# Patient Record
Sex: MALE | Employment: FULL TIME | URBAN - METROPOLITAN AREA
[De-identification: names, ages, dates, MRNs, and addresses within clinical notes are randomized per-mention and may not be internally consistent; named-entity substitution may affect disease eponyms.]

---

## 2019-09-13 ENCOUNTER — HOSPITAL ENCOUNTER (EMERGENCY)
Age: 29
Discharge: HOME | End: 2019-09-13
Attending: EMERGENCY MEDICINE
Payer: COMMERCIAL

## 2019-09-13 ENCOUNTER — APPOINTMENT (OUTPATIENT)
Dept: GENERAL RADIOLOGY | Age: 29
End: 2019-09-13
Payer: COMMERCIAL

## 2019-09-13 VITALS
SYSTOLIC BLOOD PRESSURE: 148 MMHG | RESPIRATION RATE: 18 BRPM | WEIGHT: 250 LBS | HEART RATE: 79 BPM | TEMPERATURE: 97.2 F | BODY MASS INDEX: 32.08 KG/M2 | OXYGEN SATURATION: 98 % | DIASTOLIC BLOOD PRESSURE: 91 MMHG | HEIGHT: 74 IN

## 2019-09-13 DIAGNOSIS — J90 PLEURAL EFFUSION: ICD-10-CM

## 2019-09-13 DIAGNOSIS — R10.9 FLANK PAIN: Primary | ICD-10-CM

## 2019-09-13 LAB
-: ABNORMAL
AMORPHOUS: ABNORMAL
BACTERIA: ABNORMAL
BILIRUBIN URINE: NEGATIVE
CASTS UA: ABNORMAL /LPF (ref 0–8)
COLOR: YELLOW
CRYSTALS, UA: ABNORMAL /HPF
EPITHELIAL CELLS UA: ABNORMAL /HPF (ref 0–5)
GLUCOSE URINE: NEGATIVE
KETONES, URINE: NEGATIVE
LEUKOCYTE ESTERASE, URINE: ABNORMAL
MUCUS: ABNORMAL
NITRITE, URINE: NEGATIVE
OTHER OBSERVATIONS UA: ABNORMAL
PH UA: 5 (ref 5–8)
PROTEIN UA: NEGATIVE
RBC UA: ABNORMAL /HPF (ref 0–4)
RENAL EPITHELIAL, UA: ABNORMAL /HPF
SPECIFIC GRAVITY UA: 1.02 (ref 1–1.03)
TRICHOMONAS: ABNORMAL
TROPONIN INTERP: NORMAL
TROPONIN T: NORMAL NG/ML
TROPONIN, HIGH SENSITIVITY: 11 NG/L (ref 0–22)
TURBIDITY: CLEAR
URINE HGB: NEGATIVE
UROBILINOGEN, URINE: NORMAL
WBC UA: ABNORMAL /HPF (ref 0–5)
YEAST: ABNORMAL

## 2019-09-13 PROCEDURE — 71046 X-RAY EXAM CHEST 2 VIEWS: CPT

## 2019-09-13 PROCEDURE — 81001 URINALYSIS AUTO W/SCOPE: CPT

## 2019-09-13 PROCEDURE — 84484 ASSAY OF TROPONIN QUANT: CPT

## 2019-09-13 PROCEDURE — 99284 EMERGENCY DEPT VISIT MOD MDM: CPT

## 2019-09-13 PROCEDURE — 93005 ELECTROCARDIOGRAM TRACING: CPT | Performed by: STUDENT IN AN ORGANIZED HEALTH CARE EDUCATION/TRAINING PROGRAM

## 2019-09-13 PROCEDURE — 6370000000 HC RX 637 (ALT 250 FOR IP): Performed by: STUDENT IN AN ORGANIZED HEALTH CARE EDUCATION/TRAINING PROGRAM

## 2019-09-13 RX ORDER — AZITHROMYCIN 250 MG/1
TABLET, FILM COATED ORAL
Qty: 1 PACKET | Refills: 0 | Status: SHIPPED | OUTPATIENT
Start: 2019-09-13 | End: 2019-09-17

## 2019-09-13 RX ORDER — AZITHROMYCIN 250 MG/1
500 TABLET, FILM COATED ORAL ONCE
Status: COMPLETED | OUTPATIENT
Start: 2019-09-13 | End: 2019-09-13

## 2019-09-13 RX ORDER — CYCLOBENZAPRINE HCL 10 MG
5 TABLET ORAL ONCE
Status: DISCONTINUED | OUTPATIENT
Start: 2019-09-13 | End: 2019-09-13 | Stop reason: HOSPADM

## 2019-09-13 RX ORDER — ACETAMINOPHEN 325 MG/1
325 TABLET ORAL EVERY 6 HOURS PRN
Qty: 120 TABLET | Refills: 3 | Status: SHIPPED | OUTPATIENT
Start: 2019-09-13

## 2019-09-13 RX ORDER — CYCLOBENZAPRINE HCL 5 MG
5 TABLET ORAL 2 TIMES DAILY PRN
Qty: 20 TABLET | Refills: 0 | Status: SHIPPED | OUTPATIENT
Start: 2019-09-13 | End: 2019-09-23

## 2019-09-13 RX ORDER — ACETAMINOPHEN 325 MG/1
650 TABLET ORAL ONCE
Status: COMPLETED | OUTPATIENT
Start: 2019-09-13 | End: 2019-09-13

## 2019-09-13 RX ADMIN — ACETAMINOPHEN 650 MG: 325 TABLET ORAL at 01:38

## 2019-09-13 RX ADMIN — AZITHROMYCIN 500 MG: 250 TABLET, FILM COATED ORAL at 02:47

## 2019-09-13 ASSESSMENT — PAIN SCALES - GENERAL
PAINLEVEL_OUTOF10: 8
PAINLEVEL_OUTOF10: 8

## 2019-09-13 ASSESSMENT — PAIN DESCRIPTION - PAIN TYPE: TYPE: ACUTE PAIN

## 2019-09-13 ASSESSMENT — PAIN DESCRIPTION - LOCATION: LOCATION: FLANK

## 2019-09-13 ASSESSMENT — PAIN DESCRIPTION - ORIENTATION: ORIENTATION: RIGHT

## 2019-09-13 ASSESSMENT — PAIN DESCRIPTION - DESCRIPTORS: DESCRIPTORS: SHARP;STABBING

## 2019-09-13 NOTE — ED NOTES
Pt to ED c/o right sided flank pain with radiation to the right shoulder. Pt states that he has been having this pain for the past couple of weeks, but reports worsened pain today. Pt denies any known injury or trauma. Pt denies cardiac hx. Pt reports some SOB when he was walking up the stairs at his apartment.   On exam, pt awake and oriented x 4, pt ambulatory with steady gait, pt with stable vital signs     Luis Miguel Galdamez RN  09/13/19 [id number]

## 2019-09-15 LAB
EKG ATRIAL RATE: 62 BPM
EKG P AXIS: 27 DEGREES
EKG P-R INTERVAL: 218 MS
EKG Q-T INTERVAL: 404 MS
EKG QRS DURATION: 108 MS
EKG QTC CALCULATION (BAZETT): 410 MS
EKG R AXIS: 25 DEGREES
EKG T AXIS: -2 DEGREES
EKG VENTRICULAR RATE: 62 BPM

## 2019-09-15 PROCEDURE — 93010 ELECTROCARDIOGRAM REPORT: CPT | Performed by: INTERNAL MEDICINE

## 2020-11-09 ENCOUNTER — HOSPITAL ENCOUNTER (OUTPATIENT)
Age: 30
Setting detail: SPECIMEN
Discharge: HOME | End: 2020-11-09
Payer: COMMERCIAL

## 2020-11-09 ENCOUNTER — OFFICE VISIT (OUTPATIENT)
Dept: PRIMARY CARE CLINIC | Age: 30
End: 2020-11-09
Payer: COMMERCIAL

## 2020-11-09 VITALS
HEIGHT: 74 IN | WEIGHT: 250 LBS | BODY MASS INDEX: 32.08 KG/M2 | RESPIRATION RATE: 16 BRPM | TEMPERATURE: 98.6 F | SYSTOLIC BLOOD PRESSURE: 121 MMHG | DIASTOLIC BLOOD PRESSURE: 74 MMHG | OXYGEN SATURATION: 99 % | HEART RATE: 48 BPM

## 2020-11-09 PROCEDURE — 99213 OFFICE O/P EST LOW 20 MIN: CPT | Performed by: PHYSICIAN ASSISTANT

## 2020-11-09 ASSESSMENT — ENCOUNTER SYMPTOMS
SORE THROAT: 0
SINUS PAIN: 0
VISUAL CHANGE: 0
COUGH: 1
NAUSEA: 0
SWOLLEN GLANDS: 0
VOMITING: 0
CHANGE IN BOWEL HABIT: 0
EYES NEGATIVE: 1
RHINORRHEA: 1
ABDOMINAL PAIN: 0
SINUS PRESSURE: 1
CHEST TIGHTNESS: 0

## 2020-11-09 ASSESSMENT — PATIENT HEALTH QUESTIONNAIRE - PHQ9
1. LITTLE INTEREST OR PLEASURE IN DOING THINGS: 0
SUM OF ALL RESPONSES TO PHQ QUESTIONS 1-9: 0
SUM OF ALL RESPONSES TO PHQ QUESTIONS 1-9: 0
SUM OF ALL RESPONSES TO PHQ9 QUESTIONS 1 & 2: 0
SUM OF ALL RESPONSES TO PHQ QUESTIONS 1-9: 0
2. FEELING DOWN, DEPRESSED OR HOPELESS: 0

## 2020-11-09 NOTE — PROGRESS NOTES
gloves and washed with hot water or in a . Clean hands after handling used  items.  If possible, dedicate a lined trash can for the ill person. Use gloves when removing garbage bags, handling, and disposing of trash. Wash hands after handling or disposing of trash.  Consider consulting with your local health department about trash disposal guidance if available. Information for Household Members and Caregivers of Someone who is Sick   Call ahead before visiting your doctor   Call ahead: If you have a medical appointment, call the healthcare provider and tell them that you have or may have COVID-19. This will help the healthcare provider's office take steps to keep other people from getting infected or exposed. Wear a facemask if you are sick   ; If you are sick: You should wear a facemask when you are around other people (e.g., sharing a room or vehicle) or pets and before you enter a healthcare provider's office. ; If you are caring for others: If the person who is sick is not able to wear a facemask (for example, because it causes trouble breathing), then people who live with the person who is sick should not stay in the same room with them, or they should wear a facemask if they enter a room with the person who is sick. Cover your coughs and sneezes   ; Cover: Cover your mouth and nose with a tissue when you cough or sneeze.   ; Dispose: Throw used tissues in a lined trash can.   ; Wash hands: Immediately wash your hands with soap and water for at least 20 seconds or, if soap and water are not available, clean your hands with an alcohol-based hand  that contains at least 60% alcohol. Clean your hands often   ;  Wash hands: Wash your hands often with soap and water for at least 20 seconds, especially after blowing your nose, coughing, or sneezing; going to the bathroom; and before eating or preparing food.   ; Hand : If soap and water are not readily available, use an alcohol-based hand  with at least 60% alcohol, covering all surfaces of your hands and rubbing them together until they feel dry.   ; Soap and water: Soap and water are the best option if hands are visibly dirty.   ; Avoid touching: Avoid touching your eyes, nose, and mouth with unwashed hands. Handwashing Tips   ; Wet your hands with clean, running water (warm or cold), turn off the tap, and apply soap.  ; Lather your hands by rubbing them together with the soap. Lather the backs of your hands, between your fingers, and under your nails. ; Scrub your hands for at least 20 seconds. Need a timer? Hum the [place] from beginning to end twice.  ; Rinse your hands well under clean, running water.  ; Dry your hands using a clean towel or air dry them. Avoid sharing personal household items   ; Do not share: You should not share dishes, drinking glasses, cups, eating utensils, towels, or bedding with other people or pets in your home.   ; Wash thoroughly after use: After using these items, they should be washed thoroughly with soap and water. Clean all high-touch surfaces everyday   ; Clean and disinfect: Practice routine cleaning of high touch surfaces.  ; High touch surfaces include counters, tabletops, doorknobs, bathroom fixtures, toilets, phones, keyboards, tablets, and bedside tables.  ; Disinfect areas with bodily fluids: Also, clean any surfaces that may have blood, stool, or body fluids on them.   ; Household : Use a household cleaning spray or wipe, according to the label instructions. Labels contain instructions for safe and effective use of the cleaning product including precautions you should take when applying the product, such as wearing gloves and making sure you have good ventilation during use of the product.     Monitor your symptoms   Seek medical attention: Seek prompt medical attention if your illness is worsening     (e.g., difficulty breathing).   ; Call your doctor: Before seeking care, call your healthcare provider and tell them that you have, or are being evaluated for, COVID-19.   ; Wear a facemask when sick: Put on a facemask before you enter the facility. These steps will help the healthcare provider's office to keep other people in the office or waiting room from getting infected or exposed. ; Alert health department: Ask your healthcare provider to call the local or state health department. Persons who are placed under active monitoring or facilitated self-monitoring should follow instructions provided by their local health department or occupational health professionals, as appropriate.  ; Call 911 if you have a medical emergency: If you have a medical emergency and need to call 911, notify the dispatch personnel that you have, or are being evaluated for COVID-19. If possible, put on a facemask before emergency medical services arrive. Patient instructed to return to the office if symptoms worsen, return, or have any other concerns. Patient understands and is agreeable.          Rohit Isbell PA-C 11/9/2020 11:25 AM

## 2020-11-09 NOTE — LETTER
[address] [place] [address]  [facility] [address]  Phone: [phone]  Fax: [phone]    Ellen Curry PA-C        November 9, 2020     Patient: Kellie Bowman   YOB: 1990   Date of Visit: 11/9/2020       To Whom it May Concern:    Kellie Bowman was seen in my clinic on 11/9/2020. He is to remain off work until his [place] Number comes back negative      If you have any questions or concerns, please don't hesitate to call.     Sincerely,         Ellen Curry PA-C

## 2020-11-15 LAB — SARS-COV-2, NAA: DETECTED

## 2020-11-16 ENCOUNTER — TELEPHONE (OUTPATIENT)
Dept: ADMINISTRATIVE | Age: 30
End: 2020-11-16

## 2020-11-16 ENCOUNTER — TELEPHONE (OUTPATIENT)
Dept: PRIMARY CARE CLINIC | Age: 30
End: 2020-11-16

## 2022-10-25 ENCOUNTER — HOSPITAL ENCOUNTER (OUTPATIENT)
Dept: GENERAL RADIOLOGY | Facility: CLINIC | Age: 32
Discharge: HOME | End: 2022-10-27
Payer: COMMERCIAL

## 2022-10-25 DIAGNOSIS — M79.642 PAIN OF LEFT HAND: ICD-10-CM

## 2022-10-25 DIAGNOSIS — R20.2 LEFT HAND PARESTHESIA: ICD-10-CM

## 2022-10-25 PROCEDURE — 73130 X-RAY EXAM OF HAND: CPT

## 2022-10-25 PROCEDURE — 73080 X-RAY EXAM OF ELBOW: CPT

## 2023-04-12 LAB
GENETICS TEST NAME-DATA CONVERSION: NORMAL
LAB MOLECULAR CA TECHNICAL NOTES: NORMAL

## 2023-08-31 PROBLEM — R76.0 ANTIPHOSPHOLIPID ANTIBODY POSITIVE: Status: ACTIVE | Noted: 2023-08-31

## 2023-08-31 PROBLEM — E78.41 ELEVATED LIPOPROTEIN A LEVEL: Status: ACTIVE | Noted: 2023-08-31

## 2023-08-31 PROBLEM — I21.09: Status: ACTIVE | Noted: 2023-08-31

## 2023-08-31 PROBLEM — I51.3 LEFT VENTRICULAR THROMBUS: Status: ACTIVE | Noted: 2023-08-31

## 2023-08-31 PROBLEM — I25.42 SPONTANEOUS DISSECTION OF CORONARY ARTERY: Status: ACTIVE | Noted: 2023-08-31

## 2023-08-31 RX ORDER — ASPIRIN 81 MG/1
81 TABLET ORAL DAILY
COMMUNITY

## 2023-08-31 RX ORDER — ATORVASTATIN CALCIUM 40 MG/1
40 TABLET, FILM COATED ORAL DAILY
COMMUNITY

## 2023-08-31 RX ORDER — LISINOPRIL 2.5 MG/1
1 TABLET ORAL DAILY
COMMUNITY

## 2023-08-31 RX ORDER — METOPROLOL SUCCINATE 25 MG/1
1 TABLET, EXTENDED RELEASE ORAL DAILY
COMMUNITY

## 2023-08-31 RX ORDER — WARFARIN 7.5 MG/1
1 TABLET ORAL
COMMUNITY

## 2023-08-31 RX ORDER — NITROGLYCERIN 0.4 MG/1
0.4 TABLET SUBLINGUAL EVERY 5 MIN PRN
COMMUNITY

## 2023-10-09 ENCOUNTER — APPOINTMENT (OUTPATIENT)
Dept: CARDIOLOGY | Facility: HOSPITAL | Age: 33
End: 2023-10-09
Payer: COMMERCIAL

## 2023-10-31 ENCOUNTER — APPOINTMENT (OUTPATIENT)
Dept: CARDIOLOGY | Facility: HOSPITAL | Age: 33
End: 2023-10-31
Payer: COMMERCIAL

## 2024-04-08 ENCOUNTER — OFFICE VISIT (OUTPATIENT)
Dept: ORTHOPEDIC SURGERY | Age: 34
End: 2024-04-08
Payer: COMMERCIAL

## 2024-04-08 VITALS — WEIGHT: 260 LBS | RESPIRATION RATE: 14 BRPM | BODY MASS INDEX: 32.33 KG/M2 | HEIGHT: 75 IN

## 2024-04-08 DIAGNOSIS — M25.512 ACUTE PAIN OF LEFT SHOULDER: ICD-10-CM

## 2024-04-08 DIAGNOSIS — M77.12 LATERAL EPICONDYLITIS OF LEFT ELBOW: Primary | ICD-10-CM

## 2024-04-08 DIAGNOSIS — M25.522 LEFT ELBOW PAIN: ICD-10-CM

## 2024-04-08 DIAGNOSIS — M75.82 ROTATOR CUFF TENDONITIS, LEFT: ICD-10-CM

## 2024-04-08 PROCEDURE — 99203 OFFICE O/P NEW LOW 30 MIN: CPT

## 2024-04-08 RX ORDER — DICLOFENAC SODIUM 75 MG/1
75 TABLET, DELAYED RELEASE ORAL 2 TIMES DAILY
Qty: 28 TABLET | Refills: 0 | Status: SHIPPED | OUTPATIENT
Start: 2024-04-08 | End: 2024-04-22

## 2024-04-08 NOTE — PROGRESS NOTES
Orthopedic Shoulder Encounter Note     Chief complaint: Left shoulder pain; Left elbow pain    HPI: Andres Burkett is a 33 y.o.  right-hand dominant male who presents for evaluation of his left shoulder and elbow.  Patient states that about 3 months ago he started to notice some pain over the lateral aspect of his left elbow.  Patient participates in [employer] about 5 to 6 days a week and is very active.  He states that the pain at the elbow started off being mild but it has increased and now is causing him discomfort all the time.  He states that there was no specific injury or trauma to the area but states that he started to notice that more prominently with activities involving picking up and movement at the wrist.  Patient pinpoints the pain over the lateral aspect of the elbow near the joint line.  In addition patient states that about a week ago he noticed the left shoulder started to bother him again with no inciting event or trauma.  States that it is hard to localize the pain but would say it is more over the lateral aspect of the shoulder.  Denies any weakness or stiffness in that shoulder.  Denies any numbness or tingling into the hand at this point.  Patient has not tried much in terms of management of these conditions other than some stretching on his own prior to working out.  States that he did use a massage gun over the elbow which did provide him a little relief but that was before it was bothering him on a daily basis.    Previous treatment:    NSAIDs: None    Physical Therapy: None    Injections: None    Surgeries: None    Review of Systems:   Constitutional: Negative for fever, chills, sweats.   Pain Score:   5  Neurological: Negative for headache, numbness, or weakness.   Musculoskeletal: As noted in HPI     Past Medical History  Andres  has a past medical history of Cancer (HCC).    Past Surgical History  Andres  has a past surgical history that includes orthopedic surgery.    Current

## 2024-06-17 ENCOUNTER — OFFICE VISIT (OUTPATIENT)
Dept: ORTHOPEDIC SURGERY | Age: 34
End: 2024-06-17
Payer: COMMERCIAL

## 2024-06-17 VITALS — RESPIRATION RATE: 16 BRPM | HEIGHT: 75 IN | WEIGHT: 260 LBS | BODY MASS INDEX: 32.33 KG/M2

## 2024-06-17 DIAGNOSIS — M77.11 LATERAL EPICONDYLITIS OF BOTH ELBOWS: Primary | ICD-10-CM

## 2024-06-17 DIAGNOSIS — M77.12 LATERAL EPICONDYLITIS OF BOTH ELBOWS: Primary | ICD-10-CM

## 2024-06-17 DIAGNOSIS — M25.521 RIGHT ELBOW PAIN: ICD-10-CM

## 2024-06-17 PROCEDURE — 20551 NJX 1 TENDON ORIGIN/INSJ: CPT

## 2024-06-17 PROCEDURE — 99213 OFFICE O/P EST LOW 20 MIN: CPT

## 2024-06-17 RX ORDER — LIDOCAINE HYDROCHLORIDE 10 MG/ML
1 INJECTION, SOLUTION INFILTRATION; PERINEURAL ONCE
Status: COMPLETED | OUTPATIENT
Start: 2024-06-17 | End: 2024-06-17

## 2024-06-17 RX ORDER — LIDOCAINE HYDROCHLORIDE 10 MG/ML
2 INJECTION, SOLUTION INFILTRATION; PERINEURAL ONCE
Status: SHIPPED | OUTPATIENT
Start: 2024-06-17

## 2024-06-17 RX ORDER — TRIAMCINOLONE ACETONIDE 40 MG/ML
40 INJECTION, SUSPENSION INTRA-ARTICULAR; INTRAMUSCULAR ONCE
Status: COMPLETED | OUTPATIENT
Start: 2024-06-17 | End: 2024-06-17

## 2024-06-17 RX ADMIN — TRIAMCINOLONE ACETONIDE 40 MG: 40 INJECTION, SUSPENSION INTRA-ARTICULAR; INTRAMUSCULAR at 09:39

## 2024-06-17 RX ADMIN — LIDOCAINE HYDROCHLORIDE 1 ML: 10 INJECTION, SOLUTION INFILTRATION; PERINEURAL at 10:11

## 2024-06-17 NOTE — PROGRESS NOTES
warmth, erythema or ecchymosis.  No obvious swelling.  2+ radial pulse with brisk capillary refill.  Sensation is grossly intact to light touch throughout.  Patient does have significant tenderness palpation over the level of the lateral epicondyle.  No tenderness at the olecranon, medial epicondyle.  Negative Tinel's at the elbow.  No gross instability throughout the elbow.  Patient has full range of motion at the elbow without restriction.  Patient does have pain with resisted wrist extension, resisted pronation as well as some mild pain with resisted supination.    Imaging: Previous left elbow x-rays were obtained at his last visit.    Three-view x-rays of the right elbow were obtained on 6/17/2024 and independently reviewed demonstrating no acute fracture, dislocation.  Well-maintained ulnotrochlear and radiocapitellar joint spacing.  Overall normal x-rays of the right elbow.    Impression/plan: Mr. Burkett is a 34-year-old male who presented to the clinic today for a follow-up on left elbow pain and also new onset right elbow pain.  I did discuss that I do believe he is dealing with a bilateral lateral epicondylitis at this time with the left still being more prominent than the right.  There is no gross instability throughout the level of the elbow.  No numbness or tingling present.  At this time given the fact he is given the treatment process 6 weeks with a wrist brace as well as anti-inflammatory medications I do believe it is reasonable to proceed with an injection into the left lateral epicondyle.  I did advise that we initiate conservative treatment for the right tennis elbow at this time by providing him with a wrist brace that he should wear while he is active and he should start some topical Voltaren that he should use 3-4 times a day over the affected area.  Patient would like to proceed with a cortisone injection into the left lateral epicondyle which was provided in office as outlined below.  He

## 2025-06-19 ENCOUNTER — HOSPITAL ENCOUNTER (EMERGENCY)
Facility: CLINIC | Age: 35
Discharge: HOME | End: 2025-06-19
Attending: EMERGENCY MEDICINE
Payer: COMMERCIAL

## 2025-06-19 VITALS
WEIGHT: 260 LBS | DIASTOLIC BLOOD PRESSURE: 78 MMHG | TEMPERATURE: 98.2 F | BODY MASS INDEX: 32.33 KG/M2 | SYSTOLIC BLOOD PRESSURE: 125 MMHG | RESPIRATION RATE: 15 BRPM | HEIGHT: 75 IN | HEART RATE: 64 BPM | OXYGEN SATURATION: 98 %

## 2025-06-19 DIAGNOSIS — L30.9 DERMATITIS: Primary | ICD-10-CM

## 2025-06-19 DIAGNOSIS — R21 RASH: ICD-10-CM

## 2025-06-19 PROCEDURE — 6370000000 HC RX 637 (ALT 250 FOR IP): Performed by: EMERGENCY MEDICINE

## 2025-06-19 PROCEDURE — 99284 EMERGENCY DEPT VISIT MOD MDM: CPT

## 2025-06-19 PROCEDURE — 6360000002 HC RX W HCPCS: Performed by: EMERGENCY MEDICINE

## 2025-06-19 PROCEDURE — 96372 THER/PROPH/DIAG INJ SC/IM: CPT

## 2025-06-19 RX ORDER — PREDNISONE 50 MG/1
50 TABLET ORAL DAILY
Qty: 4 TABLET | Refills: 0 | Status: SHIPPED | OUTPATIENT
Start: 2025-06-20 | End: 2025-06-24

## 2025-06-19 RX ORDER — FAMOTIDINE 20 MG/1
20 TABLET, FILM COATED ORAL ONCE
Status: COMPLETED | OUTPATIENT
Start: 2025-06-19 | End: 2025-06-19

## 2025-06-19 RX ORDER — DEXAMETHASONE SODIUM PHOSPHATE 10 MG/ML
10 INJECTION, SOLUTION INTRAMUSCULAR; INTRAVENOUS ONCE
Status: COMPLETED | OUTPATIENT
Start: 2025-06-19 | End: 2025-06-19

## 2025-06-19 RX ORDER — FAMOTIDINE 20 MG/1
20 TABLET, FILM COATED ORAL 2 TIMES DAILY
Qty: 10 TABLET | Refills: 0 | Status: SHIPPED | OUTPATIENT
Start: 2025-06-19 | End: 2025-06-24

## 2025-06-19 RX ADMIN — DEXAMETHASONE SODIUM PHOSPHATE 10 MG: 10 INJECTION, SOLUTION INTRAMUSCULAR; INTRAVENOUS at 22:51

## 2025-06-19 RX ADMIN — FAMOTIDINE 20 MG: 20 TABLET, FILM COATED ORAL at 22:50

## 2025-06-19 ASSESSMENT — PAIN - FUNCTIONAL ASSESSMENT
PAIN_FUNCTIONAL_ASSESSMENT: NONE - DENIES PAIN
PAIN_FUNCTIONAL_ASSESSMENT: NONE - DENIES PAIN

## 2025-06-20 NOTE — ED NOTES
Pt presents to the ED via private auto accompanied by his wife. Pt states he has been experiencing a rash that itches x1 day. Pt has been using a Benadryl cream and taking Benadryl PO, last at 630pm

## 2025-06-20 NOTE — DISCHARGE INSTRUCTIONS
Take medications as prescribed    Continue Benadryl 25 mg 3 times a day for the next 5 days.  No driving if taking.  Follow-up with family physician for reevaluation may need dermatologist if not improving     Return immediately if any worsening symptoms or any other concerns    Please understand that early in the process of an illness or injury, an emergency department workup can be falsely reassuring.      Tell us how we did visit: [email or web address]   and let us know about your experience

## 2025-06-20 NOTE — ED PROVIDER NOTES
[facility]  EMERGENCY DEPARTMENT ENCOUNTER      Pt Name: Andres Burkett  MRN: [id number]  Birthdate 1990  Date of evaluation: 6/19/2025    CHIEF COMPLAINT       Chief Complaint   Patient presents with    Rash     X1 day        HISTORY OF PRESENT ILLNESS      Andres Burkett is a 35 y.o. male who presents to the emergency department complaining of a rash for the past 1 to 2 days.  Rash on his left wrist is right antecubital fossa as well as his face and shins.  He has been taking Benadryl and using Benadryl ointment without much relief.  Denies any difficulty breathing or difficulty swallowing.  History of poison ivy reaction very similar.  No other relevant symptoms.     REVIEW OF SYSTEMS       CONSTITUTIONAL: No fevers or chills   HENT: No sore throat, rhinorrhea, or earache   EYES: No blurry vision or double vision no drainage   CARDIOVASCULAR: No chest pain or tachycardia   RESPIRATORY: No wheezing or shortness of breath no cough   GASTROINTESTINAL: No nausea, vomiting, diarrhea, constipation, or abdominal pain   : No hematuria or dysuria   MUSCULOSKELETAL: No swelling or pain   SKIN: Positive pruritic rash   NEUROLOGICAL: No focal neurologic complaints, paresthesias, weakness, or headache      PAST MEDICAL HISTORY    has a past medical history of Cancer (HCC).    SURGICAL HISTORY      has a past surgical history that includes orthopedic surgery.    CURRENT MEDICATIONS       Previous Medications    ACETAMINOPHEN (TYLENOL) 325 MG TABLET    Take 1 tablet by mouth every 6 hours as needed for Pain    DICLOFENAC (VOLTAREN) 75 MG EC TABLET    Take 1 tablet by mouth 2 times daily for 14 days       ALLERGIES     has no known allergies.    FAMILY HISTORY     has no family status information on file.      family history is not on file.    SOCIAL HISTORY      reports that he has never smoked. He has never used smokeless tobacco. He reports current alcohol use. He reports that he does not use  drugs.    PHYSICAL EXAM       ED Triage Vitals [06/19/25 2229]   BP Systolic BP Percentile Diastolic BP Percentile Temp Temp Source Pulse Respirations SpO2   (!) 150/86 -- -- 98.2 °F (36.8 °C) Oral 74 15 97 %      Height Weight - Scale         1.905 m (6' 3\") 117.9 kg (260 lb)           BP (!) 150/86   Pulse 74   Temp 98.2 °F (36.8 °C) (Oral)   Resp 15   Ht 1.905 m (6' 3\")   Wt 117.9 kg (260 lb)   SpO2 97%   BMI 32.50 kg/m²      CONSTITUTIONAL: Alert, oriented x3, nontoxic, answering questions appropriately, acting properly for age, in no acute distress     HEENT: Extraocular muscles intact, mucus membranes moist, no angioedema no uvular edema pupils equal, round, reactive to light, pruritic papular rash on the face including the right side of the forehead and underneath the left eye    NECK: Trachea midline no meningismus no stridor    CARDIOVASCULAR: Regular rhythm and rate no appreciable murmur distal capillary refill less than 2-seconds    RESPIRATORY: Clear to auscultation bilaterally no wheezes, rhonchi, rales, no respiratory distress no tachypnea no retractions no hypoxia    GASTROINTESTINAL: Soft, nontender, nondistended, positive bowel sounds.  No abdominal bruit no pulsatile mass. No rebound, rigidity, or guarding.     MUSCULOSKELETAL: No extremity pain or swelling     NEUROLOGIC: Moving all 4 extremities without difficulty there are no gross focal neurologic deficits     SKIN: Warm and dry.  Papular pruritic erythematous rash on the left volar wrist as well as right antecubital fossa     DIFFERENTIAL DIAGNOSIS/ MDM:     Rash consistent with contact dermatitis.  Taking p.o. Benadryl will add Pepcid and steroids.  Follow-up with family physician for reevaluation and return if worsening symptoms or any other concerns.     DIAGNOSTIC RESULTS     EKG: All EKG's are interpreted by the Emergency Department Physician who either signs or Co-signs this chart in the absence of a cardiologist.     Not